# Patient Record
Sex: MALE | URBAN - METROPOLITAN AREA
[De-identification: names, ages, dates, MRNs, and addresses within clinical notes are randomized per-mention and may not be internally consistent; named-entity substitution may affect disease eponyms.]

---

## 2018-03-01 ENCOUNTER — OUTPATIENT (OUTPATIENT)
Dept: OUTPATIENT SERVICES | Facility: HOSPITAL | Age: 48
LOS: 1 days | End: 2018-03-01
Payer: MEDICAID

## 2018-03-01 PROCEDURE — G9001: CPT

## 2018-03-19 DIAGNOSIS — R69 ILLNESS, UNSPECIFIED: ICD-10-CM

## 2024-11-13 ENCOUNTER — EMERGENCY (EMERGENCY)
Facility: HOSPITAL | Age: 54
LOS: 1 days | End: 2024-11-13
Attending: STUDENT IN AN ORGANIZED HEALTH CARE EDUCATION/TRAINING PROGRAM
Payer: COMMERCIAL

## 2024-11-13 PROCEDURE — 99291 CRITICAL CARE FIRST HOUR: CPT | Mod: 25

## 2024-11-13 PROCEDURE — 92950 HEART/LUNG RESUSCITATION CPR: CPT

## 2024-11-13 PROCEDURE — 31500 INSERT EMERGENCY AIRWAY: CPT

## 2024-11-13 PROCEDURE — 93010 ELECTROCARDIOGRAM REPORT: CPT

## 2024-11-13 PROCEDURE — 99285 EMERGENCY DEPT VISIT HI MDM: CPT | Mod: 25

## 2024-11-13 RX ORDER — ALTEPLASE 2.2 MG/2ML
100 INJECTION, POWDER, LYOPHILIZED, FOR SOLUTION INTRAVENOUS ONCE
Refills: 0 | Status: ACTIVE | OUTPATIENT
Start: 2024-11-13 | End: 2024-11-13

## 2024-11-13 NOTE — ED PROVIDER NOTE - ATTENDING CONTRIBUTION TO CARE
54y M w/ hx CAD s/p CABGx3; presented in respiratory distress. Pt BIBEMS; was noted to be hypoxic to 70s in the field. Pt reporting acute onset around 1 hour prior to arrival of chest tightness and trouble breathing. On initial assessment pt ill-appearing and in severe respiratory distress. +Diminished breath sounds b/l. Shortly after arrival pt became unresponsive and lost pulses. CPR initiated. Initial rhythm PEA. Given multiple epis, bicarb, calcium. Pt then went into pulseless V-tach. Shocked x 2 at 300 J. Given amiodarone 300 mg IV bolus. Regained pulse afterwards. Started on epi drip. Given magnesium and additional bicarb. Pt started to become bradycardic with monitor concerning for CHB. Subsequently lost pulses again. CPR resumed. Pt remained pulseless despite additional epis. Pupils fixed and dilated on exam. Spoke with wife at bedside and advised of grim prognosis. She reports that pt had been complaining of some shortness of breath since last night, but tonight became acutely worse and asked to go to the hospital. Pt remained without pulse and was pronounced dead at 2058. Death certificate to be completed.

## 2024-11-13 NOTE — ED ADULT TRIAGE NOTE - CHIEF COMPLAINT QUOTE
pt biba for hypoxia and possible STEMI, patient went unresponsive and into cardiac arrest upon arrival

## 2024-11-13 NOTE — ED PROVIDER NOTE - PHYSICAL EXAMINATION
Const: Pt is unresponsive   HEENT: No signs of external trauma   Eyes: Pupils 4 mm, fixed and dilated  Cardiac:  no palpable central pulses, no auscultated will cardiac sounds  Resp:  bilateral breath sounds with mechanical ventilation  Abd: Soft, non-distended  Skin: cyanotic  Neuro: gcs 3

## 2024-11-13 NOTE — ED PROVIDER NOTE - OBJECTIVE STATEMENT
54-year-old male patient with a history of heart disease, status post CABG  presented to ED in respiratory distress, found to be hypoxic, upon arriving in critical 1 into PEA cardiac arrest.  Patient was found to be pulseless, apneic, PEA on cardiac monitor.  CPR was started, patient was intubated for airway protection, resuscitation for approximately 50 minutes.  Multiple rounds of epinephrine, calcium, amiodarone given admit to shocked at 300 J given.  Patient regained cardiac activity for approximately 7 minutes before losing cardiac activity again.  Wife was brought to bedside, stated that patient was in respiratory distress yesterday, felt like it was worsening today and was brought to the ED.  After losing pulses again, 20 more minutes of CPR continued without return of spontaneous circulation.  Patient with fixed and dilated pupils, downtime of 15 minutes, no cardiac to video on cardiac ultrasound.  Patient pronounced dead at 2058 Melolabial Interpolation Flap Text: A decision was made to reconstruct the defect utilizing an interpolation axial flap and a staged reconstruction.  A telfa template was made of the defect.  This telfa template was then used to outline the melolabial interpolation flap.  The donor area for the pedicle flap was then injected with anesthesia.  The flap was excised through the skin and subcutaneous tissue down to the layer of the underlying musculature.  The pedicle flap was carefully excised within this deep plane to maintain its blood supply.  The edges of the donor site were undermined.   The donor site was closed in a primary fashion.  The pedicle was then rotated into position and sutured.  Once the tube was sutured into place, adequate blood supply was confirmed with blanching and refill.  The pedicle was then wrapped with xeroform gauze and dressed appropriately with a telfa and gauze bandage to ensure continued blood supply and protect the attached pedicle.

## 2024-11-13 NOTE — ED PROVIDER NOTE - CLINICAL SUMMARY MEDICAL DECISION MAKING FREE TEXT BOX
54-year-old male patient presented to the ED in respiratory distress, immediately wanted to PEA cardiac arrest, CPR and resuscitation efforts were started with multiple rounds of medication, short return of spontaneous circulation but lost shortly after.  Despite resuscitative efforts, resuscitation x 50 minutes without return of spontaneous circulation.  Wife at bedside, explained clinical status of patient.  Given length of resuscitation, downtime, fixed and dilated pupils, cyanosis, no return of spontaneous cardiac activity, patient pronounced dead at 2058